# Patient Record
Sex: FEMALE | Employment: OTHER | URBAN - METROPOLITAN AREA
[De-identification: names, ages, dates, MRNs, and addresses within clinical notes are randomized per-mention and may not be internally consistent; named-entity substitution may affect disease eponyms.]

---

## 2020-07-23 ENCOUNTER — CONSULTATION (OUTPATIENT)
Dept: URBAN - METROPOLITAN AREA CLINIC 11 | Facility: CLINIC | Age: 70
End: 2020-07-23

## 2020-07-23 ASSESSMENT — VISUAL ACUITY
OS_SC: 20/20
OD_SC: 20/100
OD_PH: 20/25

## 2020-07-23 ASSESSMENT — TONOMETRY
OD_IOP_MMHG: 12
OS_IOP_MMHG: 12

## 2020-07-23 NOTE — PATIENT DISCUSSION
Greater than 50% of exam spent in face to face dialogue with patient. I have recommended that she have vitrectomy surgery to remove the visually significant opacities from the right eye. All questions and concerns addressed at length and she would like to schedule for the next available date.

## 2022-12-05 ENCOUNTER — FOLLOW UP (OUTPATIENT)
Facility: LOCATION | Age: 72
End: 2022-12-05

## 2022-12-05 PROCEDURE — 92012 INTRM OPH EXAM EST PATIENT: CPT

## 2022-12-05 ASSESSMENT — VISUAL ACUITY
OD_SC: 20/25-2
OS_SC: 20/25+2

## 2022-12-05 ASSESSMENT — TONOMETRY
OD_IOP_MMHG: 16
OS_IOP_MMHG: 15

## 2023-01-17 ENCOUNTER — ESTABLISHED PATIENT (OUTPATIENT)
Dept: URBAN - METROPOLITAN AREA CLINIC 11 | Facility: CLINIC | Age: 73
End: 2023-01-17

## 2023-01-17 DIAGNOSIS — H35.373: ICD-10-CM

## 2023-01-17 DIAGNOSIS — H43.312: ICD-10-CM

## 2023-01-17 DIAGNOSIS — H43.812: ICD-10-CM

## 2023-01-17 PROCEDURE — 92134 CPTRZ OPH DX IMG PST SGM RTA: CPT

## 2023-01-17 PROCEDURE — 99214 OFFICE O/P EST MOD 30 MIN: CPT

## 2023-01-17 PROCEDURE — 92201 OPSCPY EXTND RTA DRAW UNI/BI: CPT

## 2023-01-17 ASSESSMENT — VISUAL ACUITY
OS_SC: 20/20
OD_PH: 20/30-1
OD_SC: 20/200

## 2023-01-17 ASSESSMENT — TONOMETRY
OD_IOP_MMHG: 18
OS_IOP_MMHG: 13

## 2023-01-17 NOTE — PATIENT DISCUSSION
Vitreous abnormalities may be related to birth and secondary to PVD. Discussed observation vs surgery and patient has agreed to proceed with PPV after reviewing risks, alternatives and benefits.

## 2023-01-17 NOTE — PATIENT DISCUSSION
Impression: Vitreous degeneration, bilateral: H43.813. Plan: Patient education regarding symptoms and risks of retinal holes, tears, and detachment. The patient was instructed to contact office immediately should symptoms occur. Continue to monitor. Stable.

## 2023-09-11 ENCOUNTER — FOLLOW UP (OUTPATIENT)
Facility: LOCATION | Age: 73
End: 2023-09-11

## 2023-09-11 DIAGNOSIS — H35.373: ICD-10-CM

## 2023-09-11 DIAGNOSIS — H02.831: ICD-10-CM

## 2023-09-11 DIAGNOSIS — H02.834: ICD-10-CM

## 2023-09-11 DIAGNOSIS — H02.052: ICD-10-CM

## 2023-09-11 PROCEDURE — 67820 REVISE EYELASHES: CPT

## 2023-09-11 PROCEDURE — 92012 INTRM OPH EXAM EST PATIENT: CPT

## 2023-09-11 ASSESSMENT — TONOMETRY
OS_IOP_MMHG: 15
OD_IOP_MMHG: 14

## 2023-09-11 ASSESSMENT — VISUAL ACUITY
OD_SC: J1+
OD_SC: 20/70-2
OS_SC: 20/25-1

## 2024-01-24 ENCOUNTER — EMERGENCY VISIT (OUTPATIENT)
Facility: LOCATION | Age: 74
End: 2024-01-24

## 2024-01-24 DIAGNOSIS — H00.025: ICD-10-CM

## 2024-01-24 DIAGNOSIS — H00.024: ICD-10-CM

## 2024-01-24 PROCEDURE — 99213 OFFICE O/P EST LOW 20 MIN: CPT

## 2024-01-24 ASSESSMENT — VISUAL ACUITY
OS_SC: 20/30
OD_SC: 20/100-2

## 2024-10-04 ENCOUNTER — COMPREHENSIVE EXAM (OUTPATIENT)
Age: 74
End: 2024-10-04

## 2024-10-04 DIAGNOSIS — H02.831: ICD-10-CM

## 2024-10-04 DIAGNOSIS — H02.834: ICD-10-CM

## 2024-10-04 DIAGNOSIS — H02.052: ICD-10-CM

## 2024-10-04 DIAGNOSIS — H52.13: ICD-10-CM

## 2024-10-04 DIAGNOSIS — H52.202: ICD-10-CM

## 2024-10-04 DIAGNOSIS — H35.373: ICD-10-CM

## 2024-10-04 PROCEDURE — 92014 COMPRE OPH EXAM EST PT 1/>: CPT

## 2024-10-04 PROCEDURE — 92015 DETERMINE REFRACTIVE STATE: CPT

## 2024-10-04 PROCEDURE — 92134 CPTRZ OPH DX IMG PST SGM RTA: CPT
